# Patient Record
Sex: FEMALE | Race: BLACK OR AFRICAN AMERICAN
[De-identification: names, ages, dates, MRNs, and addresses within clinical notes are randomized per-mention and may not be internally consistent; named-entity substitution may affect disease eponyms.]

---

## 2020-10-15 ENCOUNTER — HOSPITAL ENCOUNTER (INPATIENT)
Dept: HOSPITAL 35 - ER | Age: 45
LOS: 5 days | Discharge: HOME | DRG: 378 | End: 2020-10-20
Attending: INTERNAL MEDICINE | Admitting: INTERNAL MEDICINE
Payer: COMMERCIAL

## 2020-10-15 VITALS — DIASTOLIC BLOOD PRESSURE: 112 MMHG | SYSTOLIC BLOOD PRESSURE: 213 MMHG

## 2020-10-15 VITALS — BODY MASS INDEX: 41.29 KG/M2 | WEIGHT: 233 LBS | HEIGHT: 62.99 IN

## 2020-10-15 VITALS — SYSTOLIC BLOOD PRESSURE: 218 MMHG | DIASTOLIC BLOOD PRESSURE: 115 MMHG

## 2020-10-15 DIAGNOSIS — Z88.0: ICD-10-CM

## 2020-10-15 DIAGNOSIS — E46: ICD-10-CM

## 2020-10-15 DIAGNOSIS — E11.51: ICD-10-CM

## 2020-10-15 DIAGNOSIS — I13.0: ICD-10-CM

## 2020-10-15 DIAGNOSIS — E11.319: ICD-10-CM

## 2020-10-15 DIAGNOSIS — N93.9: ICD-10-CM

## 2020-10-15 DIAGNOSIS — E66.01: ICD-10-CM

## 2020-10-15 DIAGNOSIS — Z89.511: ICD-10-CM

## 2020-10-15 DIAGNOSIS — N17.9: ICD-10-CM

## 2020-10-15 DIAGNOSIS — K20.91: ICD-10-CM

## 2020-10-15 DIAGNOSIS — K44.9: ICD-10-CM

## 2020-10-15 DIAGNOSIS — Z79.899: ICD-10-CM

## 2020-10-15 DIAGNOSIS — I25.10: ICD-10-CM

## 2020-10-15 DIAGNOSIS — Z88.6: ICD-10-CM

## 2020-10-15 DIAGNOSIS — K59.00: ICD-10-CM

## 2020-10-15 DIAGNOSIS — F12.90: ICD-10-CM

## 2020-10-15 DIAGNOSIS — N18.30: ICD-10-CM

## 2020-10-15 DIAGNOSIS — M19.90: ICD-10-CM

## 2020-10-15 DIAGNOSIS — K29.71: Primary | ICD-10-CM

## 2020-10-15 DIAGNOSIS — Z20.828: ICD-10-CM

## 2020-10-15 DIAGNOSIS — Z86.73: ICD-10-CM

## 2020-10-15 DIAGNOSIS — I50.9: ICD-10-CM

## 2020-10-15 DIAGNOSIS — N95.0: ICD-10-CM

## 2020-10-15 DIAGNOSIS — D64.9: ICD-10-CM

## 2020-10-15 DIAGNOSIS — I16.0: ICD-10-CM

## 2020-10-15 DIAGNOSIS — Z91.14: ICD-10-CM

## 2020-10-15 LAB
ALBUMIN SERPL-MCNC: 2.2 G/DL (ref 3.4–5)
ALT SERPL-CCNC: 26 U/L (ref 30–65)
ANION GAP SERPL CALC-SCNC: 12 MMOL/L (ref 7–16)
ANION GAP SERPL CALC-SCNC: 8 MMOL/L (ref 7–16)
APTT BLD: 30.3 SECONDS (ref 24.5–32.8)
AST SERPL-CCNC: 23 U/L (ref 15–37)
BASOPHILS NFR BLD AUTO: 0.8 % (ref 0–2)
BILIRUB DIRECT SERPL-MCNC: < 0.1 MG/DL
BILIRUB SERPL-MCNC: 0.3 MG/DL (ref 0.2–1)
BUN SERPL-MCNC: 25 MG/DL (ref 7–18)
BUN SERPL-MCNC: 27 MG/DL (ref 7–18)
CALCIUM SERPL-MCNC: 8.3 MG/DL (ref 8.5–10.1)
CALCIUM SERPL-MCNC: 8.6 MG/DL (ref 8.5–10.1)
CHLORIDE SERPL-SCNC: 111 MMOL/L (ref 98–107)
CHLORIDE SERPL-SCNC: 111 MMOL/L (ref 98–107)
CO2 SERPL-SCNC: 23 MMOL/L (ref 21–32)
CO2 SERPL-SCNC: 25 MMOL/L (ref 21–32)
CREAT SERPL-MCNC: 1.4 MG/DL (ref 0.6–1)
CREAT SERPL-MCNC: 1.5 MG/DL (ref 0.6–1)
EOSINOPHIL NFR BLD: 0.6 % (ref 0–3)
ERYTHROCYTE [DISTWIDTH] IN BLOOD BY AUTOMATED COUNT: 14.7 % (ref 10.5–14.5)
GLUCOSE SERPL-MCNC: 396 MG/DL (ref 74–106)
GLUCOSE SERPL-MCNC: 401 MG/DL (ref 74–106)
GRANULOCYTES NFR BLD MANUAL: 80 % (ref 36–66)
HCT VFR BLD CALC: 38.5 % (ref 37–47)
HCT VFR BLD CALC: 38.7 % (ref 37–47)
HGB BLD-MCNC: 12.7 GM/DL (ref 12–15)
HGB BLD-MCNC: 12.7 GM/DL (ref 12–15)
INR PPP: 1.1
LYMPHOCYTES NFR BLD AUTO: 12.8 % (ref 24–44)
MCH RBC QN AUTO: 29.1 PG (ref 26–34)
MCHC RBC AUTO-ENTMCNC: 33.1 G/DL (ref 28–37)
MCV RBC: 88 FL (ref 80–100)
MONOCYTES NFR BLD: 5.8 % (ref 1–8)
NEUTROPHILS # BLD: 7.4 THOU/UL (ref 1.4–8.2)
PLATELET # BLD: 257 THOU/UL (ref 150–400)
POTASSIUM SERPL-SCNC: 3.7 MMOL/L (ref 3.5–5.1)
POTASSIUM SERPL-SCNC: 3.7 MMOL/L (ref 3.5–5.1)
PROT SERPL-MCNC: 6.5 G/DL (ref 6.4–8.2)
PROTHROMBIN TIME: 11.4 SECONDS (ref 9.3–11.4)
RBC # BLD AUTO: 4.38 MIL/UL (ref 4.2–5)
SODIUM SERPL-SCNC: 144 MMOL/L (ref 136–145)
SODIUM SERPL-SCNC: 146 MMOL/L (ref 136–145)
WBC # BLD AUTO: 9.2 THOU/UL (ref 4–11)

## 2020-10-15 PROCEDURE — 62110: CPT

## 2020-10-15 PROCEDURE — 10081 I&D PILONIDAL CYST COMP: CPT

## 2020-10-15 PROCEDURE — 70005: CPT

## 2020-10-15 PROCEDURE — 62900: CPT

## 2020-10-15 NOTE — EMS
[address]   [address]                     EMS Patient Care Report       
_______________________________________________________________________________
 
Name:       VIKTOR TRUJILLO                    Room #:         213-P       ADM IN  
M.R.#:      [id number]                       Account #:      [id number]  
Admission:  10/15/20    Attend Phys:    Rivas Kelly MD   
Discharge:              Date of Birth:  [birth year]  
                                                          Report #: [id number]
                                                                    [id number]
_______________________________________________________________________________
THIS REPORT FOR:   //name//                          
 
Report Transmitted: 10/19/2020 09:00
EMS Care Summary
[place], [place]/[facility]
Incident [id number] @ 10/15/2020 08:18
 
Incident Location
[address] [place] [address]
 
Patient
VIKTOR TRUJILLO
Female, 45 Years
 1975
 
Patient Address
[address] [place] [address]
 
Patient History
Congestive Heart Failure (CHF),Diabetes,Hypertension (HTN),Stroke/CVA,Amputee,
 
Patient Allergies
Penicillin allergy,
 
Patient Medications
Iron, Carvedilol, Insulin, Furosemide, Protonix,
 
Chief Complaint
VOMITTING
 
Disposition
Transported No Lights/[place]
 
Dispatch Reason
Hemorrhage/Laceration
 
Transported To
[facility]
 
Narrative
DISPATCHED TO A HEMMORHAGE. ARRIVED ON SCENE TO FIND FIRE CREW TALKING WITH 
FEMALE PATIENT IN HER HOME. PATIENT WAS FOUND SITTING ON THE TOILET WITH A 
TRASH CAN IN HER HANDS. SHE SAID SHE WAS BLEEDING IN THE TOILET AND WAS UNAWARE 
 
 
 
[address]   [address]                     EMS Patient Care Report       
_______________________________________________________________________________
 
Name:       VIKTOR TRUJILLO                    Room #:         213-P       [facility] [place]  
M.R.#:      [id number]                       Account #:      [id number]  
Admission:  10/15/20    Attend Phys:    Rivas Kelly MD   
Discharge:              Date of Birth:  [birth year]  
                                                          Report #: [id number]
                                                                    [id number]
_______________________________________________________________________________
IF IT WAS COMING FROM HER VAGINA OR HER RECTUM. SHE SAID SHE HAS BEEN NAUSEAS 
AND VOMMITING FOR THE PAST 2 HOURS. PATIENT VITALS WERE OBTAINED ON FIRE CREWS 
MONITOR AND SHE WAS ASSISTED IN STANDING AND MOVING TO THE FRONT DOOR WHERE SHE 
WAS SEATED ON THE COT, COVERED, AND SECURED WITH STRAPS. PATIENT VITALS WERE 
REOBTAINED AND AN IV WAS LOOKED FOR BUT DUE TO POOR VASCULAR TONE WAS NOT 
ATTEMPTED. PATIENT WAS PUT ON A 3 LEAD AND SHE WAS TRANSPORTED TO THE HOSPITAL 
WITH VITALS MONITORED ENROUTE. UPON ARRIVAL AT THE HOSPITAL PATIENT WAS MOVED 
INTO ED ROOM 11 ON THE COT AND LIFTED OVER TO THE HOSPITAL BED. PATIENT CARE 
WAS TURNED OVER TO ED NURSING STAFF. 
 
Initial Vitals
@08:55P: 74,BP: 224/157,
@08:43P: 74,BP: 207/129,CO: 6,SpO2: 93,
@09:00P: 74,R: 18,BP: 125/84,Pain: 6/10,GCS: 15,SpO2: 98,Revised Trauma: 12,
@08:52P: 75,BP: 114/75,SpO2: 97,
@08:57P: 76,BP: 146/106,
@08:41P: 79,R: 78,BP: 230/131,Pain: 6/10,GCS: 15,Glucose: 466,CO: 5,SpO2: 
99,Revised Trauma: 11,MI Suspected: false 
 
Assessments
@08:25MENTAL:Person Oriented,Time Oriented,Event Oriented,Place 
Oriented,SKIN:Cold,Diaphoresis,HEENT:Head/Face: No Abnormalities,Neck/Airway: 
No Abnormalities,LUNG SOUNDS:General: Nausea,General: Vomiting,Left Upper: No 
Abnormalities,Right Upper: No Abnormalities,Left Lower: No Abnormalities,Right 
Lower: No Abnormalities,ABDOMEN:General: Nausea,General: Vomiting,Left Upper: 
No Abnormalities,Right Upper: No Abnormalities,Left Lower: No 
Abnormalities,Right Lower: No Abnormalities,PELVIS//GI:Rectal Bleeding,Pelvis 
GUOther,EXTREMITIES:Right Leg: Other,Left Leg: Other,Left Arm: No 
Abnormalities,Right Arm: No Abnormalities,PULSE:NEURO:No Abnormalities, 
 
Impression
Nausea
 
Procedures
@08:25ALS AssessmentResponse: UnchangedSucceeded@08:413-Lead ECGResponse: 
UnchangedSucceeded 
 
Timeline
08:16,Call Received
08:16,Dispatch Notified
08:18,Dispatched
08:18,En Route
08:23,On Scene
08:25,At Patient
08:25,ALS Assessment,Response: UnchangedSucceeded,
08:41,3-Lead ECG,Response: UnchangedSucceeded,
 
 
 
[address]   [address]                     EMS Patient Care Report       
_______________________________________________________________________________
 
Name:       VIKTOR TRUJILLO                    Room #:         213-P       ADM IN  
M.R.#:      [id number]                       Account #:      [id number]  
Admission:  10/15/20    Attend Phys:    Rivas Kelly MD   
Discharge:              Date of Birth:  [birth year]  
                                                          Report #: [id number]
                                                                    [id number]
_______________________________________________________________________________
08:41,BP: 230/131 M,PULSE: 79,RR: 78 R,SPO2: 99 Ox,ETCO2:  ,B,PAIN: 
6,GCS: 15, 
08:43,BP: 207/129 M,PULSE: 74,RR:  R,SPO2: 93 Ox,ETCO2:  ,BG: ,PAIN: ,GCS: ,
08:48,Depart Scene
08:52,BP: 114/75 M,PULSE: 75,RR:  R,SPO2: 97 Ox,ETCO2:  ,BG: ,PAIN: ,GCS: ,
08:55,BP: 224/157 M,PULSE: 74,RR:  R,SPO2:  Ox,ETCO2:  ,BG: ,PAIN: ,GCS: ,
08:57,BP: 146/106 M,PULSE: 76,RR:  R,SPO2:  Ox,ETCO2:  ,BG: ,PAIN: ,GCS: ,
09:00,BP: 125/84 M,PULSE: 74,RR: 18 R,SPO2: 98 Ox,ETCO2:  ,BG: ,PAIN: 6,GCS: 15,
09:02,At Destination
09:15,Call Closed
 
Disclaimer
v1.1     Copyright 2020 [employer]
This EMS Care Summary contains data elements from the applicable legal record 
(which may be displayed differently). It is designed to provide pertinent 
information for the following purposes: continuity of care, clinical quality, 
and state data reporting. The complete legal record is available to ED staff 
and administrators of the receiving hospital in [employer]'s Patient Tracker. All data 
is provided "as is."

## 2020-10-15 NOTE — EMS
[address]   [address]                     EMS Patient Care Report       
_______________________________________________________________________________
 
Name:       VIKTOR TRUJILLO                    Room #:                     PRE ER  
M.R.#:      [id number]                       Account #:      [id number]  
Admission:              Attend Phys:                          
Discharge:              Date of Birth:  [birth year]  
                                                          Report #: [id number]
                                                                    [id number]
_______________________________________________________________________________
THIS REPORT FOR:   //name//                          
 
Report Transmitted: 10/15/2020 08:44
EMS Care Summary
[place], [place]/[facility]
Incident [id number] @ 10/15/2020 08:18
 
Incident Location
[address] [place] [address]
 
Patient
VIKTOR TRUJILLO
Female, 45 Years
 1975
 
Patient Address
[address] [place] [address]
 
Patient History
Congestive Heart Failure (CHF),Diabetes,Hypertension (HTN),Stroke/CVA,Amputee,
 
Patient Allergies
Penicillin allergy,
 
Patient Medications
Iron, Carvedilol, Insulin, Furosemide, Protonix,
 
Chief Complaint
VOMITTING
 
Disposition
Transported No Lights/[place]
 
Dispatch Reason
Hemorrhage/Laceration
 
Transported To
[facility]
 
Narrative
DISPATCHED TO A HEMMORHAGE. ARRIVED ON SCENE TO FIND FIRE CREW TALKING WITH 
FEMALE PATIENT IN HER HOME. PATIENT WAS FOUND SITTING ON THE TOILET WITH A 
TRASH CAN IN HER HANDS. SHE SAID SHE WAS BLEEDING IN THE TOILET AND WAS UNAWARE 
 
 
 
[address]   [address]                     EMS Patient Care Report       
_______________________________________________________________________________
 
Name:       VIKTOR TRUJILLO                    Room #:                     PRE MAGAN PRATHER#:      [id number]                       Account #:      [id number]  
Admission:              Attend Phys:                          
Discharge:              Date of Birth:  [birth year]  
                                                          Report #: [id number]
                                                                    [id number]
_______________________________________________________________________________
IF IT WAS COMING FROM HER VAGINA OR HER RECTUM. SHE SAID SHE HAS BEEN NAUSEAS 
AND VOMMITING FOR THE PAST 2 HOURS. PATIENT VITALS WERE OBTAINED ON FIRE CREWS 
MONITOR AND SHE WAS ASSISTED IN STANDING AND MOVING TO THE FRONT DOOR WHERE SHE 
WAS SEATED ON THE COT, COVERED, AND SECURED WITH STRAPS. PATIENT VITALS WERE 
REOBTAINED AND AN IV WAS LOOKED FOR BUT DUE TO POOR VASCULAR TONE WAS NOT 
ATTEMPTED. PATIENT WAS PUT ON A 3 LEAD AND SHE WAS TRANSPORTED TO THE HOSPITAL 
WITH VITALS MONITORED ENROUTE. UPON ARRIVAL AT THE HOSPITAL PATIENT WAS MOVED 
INTO ED ROOM 11 ON THE COT AND LIFTED OVER TO THE HOSPITAL BED. PATIENT CARE 
WAS TURNED OVER TO ED NURSING STAFF. 
 
Initial Vitals
@08:55P: 74,BP: 224/157,
@08:43P: 74,BP: 207/129,CO: 6,SpO2: 93,
@09:00P: 74,R: 18,BP: 125/84,Pain: 6/10,GCS: 15,SpO2: 98,Revised Trauma: 12,
@08:52P: 75,BP: 114/75,SpO2: 97,
@08:57P: 76,BP: 146/106,
@08:41P: 79,R: 78,BP: 230/131,Pain: 6/10,GCS: 15,Glucose: 466,CO: 5,SpO2: 
99,Revised Trauma: 11,MI Suspected: false 
 
Assessments
@08:25MENTAL:Person Oriented,Time Oriented,Event Oriented,Place 
Oriented,SKIN:Cold,Diaphoresis,HEENT:Head/Face: No Abnormalities,Neck/Airway: 
No Abnormalities,LUNG SOUNDS:General: Nausea,General: Vomiting,Left Upper: No 
Abnormalities,Right Upper: No Abnormalities,Left Lower: No Abnormalities,Right 
Lower: No Abnormalities,ABDOMEN:General: Nausea,General: Vomiting,Left Upper: 
No Abnormalities,Right Upper: No Abnormalities,Left Lower: No 
Abnormalities,Right Lower: No Abnormalities,PELVIS//GI:Rectal Bleeding,Pelvis 
GUOther,EXTREMITIES:Right Leg: Other,Left Leg: Other,Left Arm: No 
Abnormalities,Right Arm: No Abnormalities,PULSE:NEURO:No Abnormalities, 
 
Impression
Nausea
 
Procedures
@08:25ALS AssessmentResponse: UnchangedSucceeded@08:413-Lead ECGResponse: 
UnchangedSucceeded 
 
Timeline
08:16,Call Received
08:16,Dispatch Notified
08:18,Dispatched
08:18,En Route
08:23,On Scene
08:25,At Patient
08:25,ALS Assessment,Response: UnchangedSucceeded,
08:41,3-Lead ECG,Response: UnchangedSucceeded,
 
 
 
[address]   [address]                     EMS Patient Care Report       
_______________________________________________________________________________
 
Name:       CASSANDRAVIKTOR                    Room #:                     PRE ER  
M.R.#:      [id number]                       Account #:      [id number]  
Admission:              Attend Phys:                          
Discharge:              Date of Birth:  [birth year]  
                                                          Report #: [id number]
                                                                    [id number]
_______________________________________________________________________________
08:41,BP: 230/131 M,PULSE: 79,RR: 78 R,SPO2: 99 Ox,ETCO2:  ,B,PAIN: 
6,GCS: 15, 
08:43,BP: 207/129 M,PULSE: 74,RR:  R,SPO2: 93 Ox,ETCO2:  ,BG: ,PAIN: ,GCS: ,
08:48,Depart Scene
08:52,BP: 114/75 M,PULSE: 75,RR:  R,SPO2: 97 Ox,ETCO2:  ,BG: ,PAIN: ,GCS: ,
08:55,BP: 224/157 M,PULSE: 74,RR:  R,SPO2:  Ox,ETCO2:  ,BG: ,PAIN: ,GCS: ,
08:57,BP: 146/106 M,PULSE: 76,RR:  R,SPO2:  Ox,ETCO2:  ,BG: ,PAIN: ,GCS: ,
09:00,BP: 125/84 M,PULSE: 74,RR: 18 R,SPO2: 98 Ox,ETCO2:  ,BG: ,PAIN: 6,GCS: 15,
09:02,At Destination
09:15,Call Closed
 
Disclaimer
v1.1     Copyright 2020 [employer]
This EMS Care Summary contains data elements from the applicable legal record 
(which may be displayed differently). It is designed to provide pertinent 
information for the following purposes: continuity of care, clinical quality, 
and state data reporting. The complete legal record is available to ED staff 
and administrators of the receiving hospital in [employer]'s Patient Tracker. All data 
is provided "as is."

## 2020-10-15 NOTE — NUR
tw hospitalist sly np, ok to leave cardene drip off. pt ok to go [facility], house supervisor next to her, she will notify her.

## 2020-10-16 VITALS — SYSTOLIC BLOOD PRESSURE: 167 MMHG | DIASTOLIC BLOOD PRESSURE: 87 MMHG

## 2020-10-16 VITALS — DIASTOLIC BLOOD PRESSURE: 104 MMHG | SYSTOLIC BLOOD PRESSURE: 163 MMHG

## 2020-10-16 VITALS — DIASTOLIC BLOOD PRESSURE: 95 MMHG | SYSTOLIC BLOOD PRESSURE: 214 MMHG

## 2020-10-16 VITALS — DIASTOLIC BLOOD PRESSURE: 98 MMHG | SYSTOLIC BLOOD PRESSURE: 171 MMHG

## 2020-10-16 VITALS — SYSTOLIC BLOOD PRESSURE: 177 MMHG | DIASTOLIC BLOOD PRESSURE: 100 MMHG

## 2020-10-16 VITALS — DIASTOLIC BLOOD PRESSURE: 92 MMHG | SYSTOLIC BLOOD PRESSURE: 178 MMHG

## 2020-10-16 VITALS — SYSTOLIC BLOOD PRESSURE: 182 MMHG | DIASTOLIC BLOOD PRESSURE: 101 MMHG

## 2020-10-16 VITALS — DIASTOLIC BLOOD PRESSURE: 54 MMHG | SYSTOLIC BLOOD PRESSURE: 124 MMHG

## 2020-10-16 VITALS — DIASTOLIC BLOOD PRESSURE: 77 MMHG | SYSTOLIC BLOOD PRESSURE: 148 MMHG

## 2020-10-16 VITALS — SYSTOLIC BLOOD PRESSURE: 214 MMHG | DIASTOLIC BLOOD PRESSURE: 130 MMHG

## 2020-10-16 VITALS — SYSTOLIC BLOOD PRESSURE: 163 MMHG | DIASTOLIC BLOOD PRESSURE: 94 MMHG

## 2020-10-16 VITALS — DIASTOLIC BLOOD PRESSURE: 68 MMHG | SYSTOLIC BLOOD PRESSURE: 162 MMHG

## 2020-10-16 VITALS — DIASTOLIC BLOOD PRESSURE: 130 MMHG | SYSTOLIC BLOOD PRESSURE: 214 MMHG

## 2020-10-16 LAB
ANION GAP SERPL CALC-SCNC: 17 MMOL/L (ref 7–16)
ANISOCYTOSIS BLD QL SMEAR: SLIGHT
BASOPHILS NFR BLD AUTO: 0 % (ref 0–2)
BUN SERPL-MCNC: 30 MG/DL (ref 7–18)
CALCIUM SERPL-MCNC: 8.2 MG/DL (ref 8.5–10.1)
CHLORIDE SERPL-SCNC: 114 MMOL/L (ref 98–107)
CO2 SERPL-SCNC: 17 MMOL/L (ref 21–32)
CREAT SERPL-MCNC: 2.2 MG/DL (ref 0.6–1)
EOSINOPHIL NFR BLD: 0 % (ref 0–3)
ERYTHROCYTE [DISTWIDTH] IN BLOOD BY AUTOMATED COUNT: 14.8 % (ref 10.5–14.5)
GLUCOSE SERPL-MCNC: 255 MG/DL (ref 74–106)
GRANULOCYTES NFR BLD MANUAL: 89 % (ref 36–66)
HCT VFR BLD CALC: 38.9 % (ref 37–47)
HCT VFR BLD CALC: 39.5 % (ref 37–47)
HGB BLD-MCNC: 12.6 GM/DL (ref 12–15)
HGB BLD-MCNC: 12.9 GM/DL (ref 12–15)
LYMPHOCYTES NFR BLD AUTO: 6 % (ref 24–44)
MAGNESIUM SERPL-MCNC: 1.8 MG/DL (ref 1.8–2.4)
MCH RBC QN AUTO: 29 PG (ref 26–34)
MCHC RBC AUTO-ENTMCNC: 32.4 G/DL (ref 28–37)
MCV RBC: 89.7 FL (ref 80–100)
MONOCYTES NFR BLD: 5 % (ref 1–8)
NEUTROPHILS # BLD: 9.3 THOU/UL (ref 1.4–8.2)
NEUTS BAND NFR BLD: 0 % (ref 0–8)
PLATELET # BLD: 206 THOU/UL (ref 150–400)
POTASSIUM SERPL-SCNC: 3.7 MMOL/L (ref 3.5–5.1)
RBC # BLD AUTO: 4.33 MIL/UL (ref 4.2–5)
SODIUM SERPL-SCNC: 148 MMOL/L (ref 136–145)
WBC # BLD AUTO: 10.5 THOU/UL (ref 4–11)

## 2020-10-16 PROCEDURE — 0DB98ZX EXCISION OF DUODENUM, VIA NATURAL OR ARTIFICIAL OPENING ENDOSCOPIC, DIAGNOSTIC: ICD-10-PCS | Performed by: INTERNAL MEDICINE

## 2020-10-16 PROCEDURE — 0DJD8ZZ INSPECTION OF LOWER INTESTINAL TRACT, VIA NATURAL OR ARTIFICIAL OPENING ENDOSCOPIC: ICD-10-PCS | Performed by: INTERNAL MEDICINE

## 2020-10-16 PROCEDURE — 0DB68ZX EXCISION OF STOMACH, VIA NATURAL OR ARTIFICIAL OPENING ENDOSCOPIC, DIAGNOSTIC: ICD-10-PCS | Performed by: INTERNAL MEDICINE

## 2020-10-16 NOTE — NUR
pt states that blood in bsc is from her period and not from stool. passed along
to sly soto and she ordered a fecal occult.

## 2020-10-16 NOTE — NUR
TW DR JEROME CORTES GI: PER DR CANO TW ENEMA IS ONLY PREP NEEDED AND
USUALLY DONE 1 HOUR PRIOR TO PROCEDURE.

## 2020-10-16 NOTE — NUR
ASSESS PT WITH DR. CROSS AT BEDSIDE FOR INCREASED NAUSEA. INSTRUCTED TO PLACE
ICE PACK ON FORHEAD AND DR. CROSS WILL ADJUST MEDICATIONS.

## 2020-10-16 NOTE — 2DMMODE
[facility]
[place]   [address]                   2 D/M-MODE ECHOCARDIOGRAM     
_______________________________________________________________________________
 
Name:       VIKTOR TRUJILLO                    Room #:         170-11      ADM IN  
M.R.#:      [id number]                       Account #:      [id number]  
Admission:  10/15/20    Attend Phys:    Rivas Kelly MD   
Discharge:              Date of Birth:  [birth year]  
                                                          Report #: [id number]
                                                                    [phone]
_______________________________________________________________________________
THIS REPORT FOR:  
 
cc:  FAM - No family physician/PCP 
     FAM - No family physician/PCP 
     Santi Ramirez MD [facility]                                        
                                                                       ~
 
--------------- APPROVED REPORT --------------
 
 
Study performed:  10/16/2020 09:27:59
 
EXAM: Comprehensive 2D, Doppler, and color-flow 
Echocardiogram 
Patient Location: ER    
Room #:  1     Status:  routine
 
      BSA:         2.05
HR: 70 bpm BP:          191/105 mmHg 
Rhythm: NSR     
 
Other Information 
Study Quality: Good
 
Indications
Diabetes
Hypertension/HDD
 
2D Dimensions
RVDd:  38.38 mm  
IVSd:  13.89 (7-11mm) LVOT Diam:  18.09 (18-24mm) 
LVDd:  51.47 mm  
PWd:  14.39 (7-11mm) Ascending Ao:  29.91 (22-36mm)
LVDs:  34.11 (25-40mm) 
Aortic Root:  29.15 mm IVC:  16.00 mm
 
Volumes
Left Atrial Volume (Systole) 
Single Plane 4CH:  34.46 mL Single Plane 2CH:  35.09 mL
    LA ESV Index:  19.00 mL/m2
 
Aortic Valve
AoV Peak Jonh.:  1.40 m/s 
AO Peak Gr.:  7.89 mmHg  LVOT Max P.38 mmHg
    LVOT Max V:  1.16 m/s
NANNETTE Vmax: 2.12 cm2  
 
 
[facility]
[address]
[place]  [address]
Phone:  [phone] 2 D/M-MODE ECHOCARDIOGRAM     
_______________________________________________________________________________
 
Name:            VIKTOR TRUJILLO                    Room #:        170-11      [facility] [place]
[facility].#:           [id number]          Account #:     [id number]  
Admission:       10/15/20         Attend Phys:   FIONA Del Toro
Discharge:                  Date of Birth: [birth year]  
                         Report #:      [id number]
        [id number]
_______________________________________________________________________________
 
Mitral Valve
    E/A Ratio:  0.6
    MV Decel. Time:  267.96 ms
MV E Max Jonh.:  0.64 m/s 
MV A Jonh.:  0.99 m/s  
MV PHT:  77.71 ms  
IVRT:  198.39 ms  
 
Pulmonary Valve
PV Peak Jonh.:  0.84 m/s PV Peak Gr.:  2.86 mmHg
 
Pulmonary Vein
P Vein S:    0.43 m/s P Vein A:  0.22 m/s
P Vein D:   0.29 m/s P Vein A Dur.:  101.5 msec
P Vein S/D Ratio:  1.48 
 
Left Ventricle
The left ventricle is normal size. There is normal LV segmental wall 
motion. Mild to moderate concentric left ventricular hypertrophy. The 
left ventricular systolic function is normal. The left ventricular 
ejection fraction is within the normal range. LVEF is 60-65%. Mild 
diastolic dysfunction
 
Right Ventricle
The right ventricle is normal size. The right ventricular systolic 
function is normal.
 
Atria
The left atrium size is normal. The right atrium size is 
normal.
 
Aortic Valve
The aortic valve is normal in structure. No aortic regurgitation is 
present. There is no aortic valvular stenosis.
 
Mitral Valve
The mitral valve is normal in structure. Mild mitral regurgitation. 
No evidence of mitral valve stenosis.
 
Tricuspid Valve
The tricuspid valve is normal in structure. There is no tricuspid 
valve regurgitation noted.
 
Pulmonic Valve
The pulmonary valve is normal in structure. There is no pulmonic 
 
 
[facility]
[employer] [place]  [address]
Phone:  [phone]                    2 D/M-MODE ECHOCARDIOGRAM     
_______________________________________________________________________________
 
Name:            VIKTOR TRUJILLO                    Room #:        170-11      ADM IN
.R.#:           [id number]          Account #:     [id number]  
Admission:       10/15/20         Attend Phys:   FIONA Del Toro
Discharge:                  Date of Birth: [birth year]  
                         Report #:      [id number]
        [id number]
_______________________________________________________________________________
valvular regurgitation.
 
Great Vessels
The aortic root is normal in size. IVC is normal in size and 
collapses >50% with inspiration.
 
Pericardium
There is no pericardial effusion.
 
<Conclusion>
The left ventricular systolic function is normal.
There is normal LV segmental wall motion.
Mild to moderate concentric left ventricular hypertrophy.
LVEF is 60-65%.
Mild diastolic dysfunction
The aortic valve is normal in structure.  No aortic regurgitation or 
stenosis
The mitral valve is normal in structure. Mild mitral 
regurgitation.
Pulmonary artery pressure could not be reliably ascertained
There is no pericardial effusion.
 
 
 
 
 
 
 
 
 
 
 
 
 
 
 
 
 
 
 
 
 
 
 
  <ELECTRONICALLY SIGNED>
   By: Santi Ramirez MD, FACC   
  10/16/20     1010
D: 10/16/20 1010                           _____________________________________
T: 10/16/20 1010                           Santi Ramirez MD, FACC     /INF

## 2020-10-16 NOTE — NUR
PT ORIENTED TO ROOM AND UNIT. BED LOW AND LOCKED SIDE RAILS UPX3, CALL LIGHT
IN REACH AND TELE APPLIED. WILL CONTINUE TO ASSESS.

## 2020-10-16 NOTE — NUR
VASCULAR ACCESS CONSULTED FOR PICC IN GI LAB. BOTH UPPER ARMS FLUID FILLED
FROM INFITRATED IV'S. BOTH ARMS ASSESSED UNABLE TO SEE BASILIC, BRACHIAL OVER
3CM DEEP, ATTEMPTED BUT UNABLE TO CANNULATE. ATTEMPTED ML IN R AND L CEPHALIC
BUT AGAIN UNABLE TO CANNULATE. SO RAC LONG #22G 2 1/2 INCH CATH PIV STARTED
FOR SCOPE. PT TOLERATED WELL. PT STATES HAD HX DRUG ABUSE WITH SCARRING ON
FOREARMS

## 2020-10-16 NOTE — NUR
PT EXTREMELY NAUSEATED AND BEGAN WRENCHING AS SOON AS SHE WAS ADMITTED TO
UNIT. GAVE IV ZOFRAN AND ASSESSED PT WITH CARDIAC NP AND SHE INSTRUCTED TO
ONLY GIVE IV MEDICATION FOR BLOOD PRESSURE. PT HAS HAD ELEVATED BLOOS PRESSURE
SINCE SHE WAS ADMITTED TO ED. WILL GIVE IV BLOOD PRESSURE MEDICATION AND IF
NOT ABLE TO KEEP SBP UNDER 170 WILL START CARDENE GTT PER CARDIOLOFY. SBP
ABOVE 200 AT THIS TIME.

## 2020-10-17 VITALS — DIASTOLIC BLOOD PRESSURE: 58 MMHG | SYSTOLIC BLOOD PRESSURE: 111 MMHG

## 2020-10-17 VITALS — SYSTOLIC BLOOD PRESSURE: 154 MMHG | DIASTOLIC BLOOD PRESSURE: 89 MMHG

## 2020-10-17 VITALS — SYSTOLIC BLOOD PRESSURE: 147 MMHG | DIASTOLIC BLOOD PRESSURE: 63 MMHG

## 2020-10-17 VITALS — SYSTOLIC BLOOD PRESSURE: 156 MMHG | DIASTOLIC BLOOD PRESSURE: 88 MMHG

## 2020-10-17 VITALS — DIASTOLIC BLOOD PRESSURE: 74 MMHG | SYSTOLIC BLOOD PRESSURE: 121 MMHG

## 2020-10-17 VITALS — SYSTOLIC BLOOD PRESSURE: 146 MMHG | DIASTOLIC BLOOD PRESSURE: 74 MMHG

## 2020-10-17 VITALS — DIASTOLIC BLOOD PRESSURE: 119 MMHG | SYSTOLIC BLOOD PRESSURE: 193 MMHG

## 2020-10-17 VITALS — SYSTOLIC BLOOD PRESSURE: 121 MMHG | DIASTOLIC BLOOD PRESSURE: 80 MMHG

## 2020-10-17 VITALS — SYSTOLIC BLOOD PRESSURE: 126 MMHG | DIASTOLIC BLOOD PRESSURE: 66 MMHG

## 2020-10-17 LAB
ALBUMIN SERPL-MCNC: 1.9 G/DL (ref 3.4–5)
ALT SERPL-CCNC: 29 U/L (ref 30–65)
ANION GAP SERPL CALC-SCNC: 14 MMOL/L (ref 7–16)
AST SERPL-CCNC: 28 U/L (ref 15–37)
BASOPHILS NFR BLD AUTO: 0.6 % (ref 0–2)
BILIRUB SERPL-MCNC: 0.3 MG/DL (ref 0.2–1)
BUN SERPL-MCNC: 30 MG/DL (ref 7–18)
CALCIUM SERPL-MCNC: 8 MG/DL (ref 8.5–10.1)
CHLORIDE SERPL-SCNC: 113 MMOL/L (ref 98–107)
CHOLEST SERPL-MCNC: 186 MG/DL (ref ?–200)
CO2 SERPL-SCNC: 20 MMOL/L (ref 21–32)
CREAT SERPL-MCNC: 2.1 MG/DL (ref 0.6–1)
EOSINOPHIL NFR BLD: 0.2 % (ref 0–3)
ERYTHROCYTE [DISTWIDTH] IN BLOOD BY AUTOMATED COUNT: 15 % (ref 10.5–14.5)
GLUCOSE SERPL-MCNC: 220 MG/DL (ref 74–106)
GRANULOCYTES NFR BLD MANUAL: 82.5 % (ref 36–66)
HCT VFR BLD CALC: 35.6 % (ref 37–47)
HCT VFR BLD CALC: 35.6 % (ref 37–47)
HDLC SERPL-MCNC: 40 MG/DL (ref 40–?)
HGB BLD-MCNC: 11.6 GM/DL (ref 12–15)
HGB BLD-MCNC: 11.9 GM/DL (ref 12–15)
LDLC SERPL-MCNC: 104 MG/DL (ref ?–100)
LYMPHOCYTES NFR BLD AUTO: 11.5 % (ref 24–44)
MAGNESIUM SERPL-MCNC: 1.9 MG/DL (ref 1.8–2.4)
MCH RBC QN AUTO: 29.2 PG (ref 26–34)
MCHC RBC AUTO-ENTMCNC: 32.6 G/DL (ref 28–37)
MCV RBC: 89.4 FL (ref 80–100)
MONOCYTES NFR BLD: 5.2 % (ref 1–8)
NEUTROPHILS # BLD: 7.7 THOU/UL (ref 1.4–8.2)
PHOSPHATE SERPL-MCNC: 4.3 MG/DL (ref 2.5–4.9)
PLATELET # BLD: 206 THOU/UL (ref 150–400)
POTASSIUM SERPL-SCNC: 3.7 MMOL/L (ref 3.5–5.1)
PROT SERPL-MCNC: 5.2 G/DL (ref 6.4–8.2)
RBC # BLD AUTO: 3.98 MIL/UL (ref 4.2–5)
SODIUM SERPL-SCNC: 147 MMOL/L (ref 136–145)
TC:HDL: 4.7 RATIO
TRIGL SERPL-MCNC: 213 MG/DL (ref ?–150)
VLDLC SERPL CALC-MCNC: 43 MG/DL (ref ?–40)
WBC # BLD AUTO: 9.3 THOU/UL (ref 4–11)

## 2020-10-17 NOTE — NUR
ASSUMED CARE OF PT AT SHIFT CHANGE. ASSESSMENT AS CHARTED MEDS GIVEN PER MAR.
PT A&OX4, NO C/O PAIN OR N/V. BP SLOWLY DECREASING, CARDENE DRIP CONTINUES. PT
TRYING CLEAR LIQUIDS. DIARRHEA BEGAN IN THE AFTERNOON. STILL NEED URINE AND
STOOL SAMPLE. WILL CONTINUE TO MONITOR AND FOLLOW POC.

## 2020-10-17 NOTE — EKG
[facility]
[place]   [address]                     ELECTROCARDIOGRAM REPORT      
_______________________________________________________________________________
 
Name:       VIKTOR TRUJILLO                    Room #:         213-P       ADM IN  
M.R.#:      [id number]                       Account #:      [id number]  
Admission:  10/15/20    Attend Phys:    Rivas Kelly MD   
Discharge:              Date of Birth:  [birth year]  
                                                          Report #: [id number]
                                                                    [id number]
_______________________________________________________________________________
THIS REPORT FOR:  
 
cc:  DOM Fu family physician/PCP 
     DOM Fu family physician/PCP 
     Santi Ramirez MD [facility]
THIS REPORT FOR:   //name//                          
 
                          [facility]
                                       
Test Date:    2020-10-16               Test Time:    22:47:45
Pat Name:     VIKTOR TRUJILLO               Department:   
Patient ID:   [id number]            Room:         213 
Gender:       F                        Technician:   MARVIN
:          1975               Requested By: Rivas Kelly
Order Number: [id number] MD:   Santi Ramirez
                                 Measurements
Intervals                              Axis          
Rate:         99                       P:            83
MO:           123                      QRS:          -32
QRSD:         83                       T:            61
QT:           372                                    
QTc:          478                                    
                           Interpretive Statements
Sinus rhythm
Leftward axis
Abnormal R-wave progression, late transition
Left ventricular hypertrophy
Baseline wander in lead(s) V1
Compared to ECG 10/15/2020 11:28:58
No significant changes
Electronically Signed On 10- 10:00:13 CDT by Santi Ramirez
[email or web address]
 
 
 
 
 
 
 
 
 
 
 
 
  <ELECTRONICALLY SIGNED>
   By: Santi Ramirez MD, FACC   
  10/17/20     1000
D: 10/16/20 2247                           _____________________________________
T: 10/16/20 2247                           Santi Ramirez MD, FACC     /EPI

## 2020-10-17 NOTE — NUR
pt lethargic at start of shift with nausea and vomiting, cardene gtt infusing,
prn meds given for c/o pain &n/v, approximatly 3 hours later pt was screeming
she needed help, no one was helping her and "she had pain everywhere none of
these meds are working, I can't breath" O2 sat 100% on ra placed on 2l/nc,
said it was too uncomfortable and pulled it off, bp remains elevated, called
Violeta, received orders for one time dose of Alprozolam and 50mcg of
fentanyl, explained to patient about medication and she calmed down and was
asleep the rest of the evening, had one incident of incont of bowel and
bladder, cardene gtt titrated to keep bp <160 and vomiting stopped while she
was sleeping. report given to next shift to con't with umberto Childers

## 2020-10-18 VITALS — SYSTOLIC BLOOD PRESSURE: 163 MMHG | DIASTOLIC BLOOD PRESSURE: 99 MMHG

## 2020-10-18 VITALS — DIASTOLIC BLOOD PRESSURE: 76 MMHG | SYSTOLIC BLOOD PRESSURE: 101 MMHG

## 2020-10-18 VITALS — DIASTOLIC BLOOD PRESSURE: 75 MMHG | SYSTOLIC BLOOD PRESSURE: 151 MMHG

## 2020-10-18 VITALS — DIASTOLIC BLOOD PRESSURE: 85 MMHG | SYSTOLIC BLOOD PRESSURE: 147 MMHG

## 2020-10-18 VITALS — DIASTOLIC BLOOD PRESSURE: 76 MMHG | SYSTOLIC BLOOD PRESSURE: 143 MMHG

## 2020-10-18 VITALS — DIASTOLIC BLOOD PRESSURE: 106 MMHG | SYSTOLIC BLOOD PRESSURE: 175 MMHG

## 2020-10-18 VITALS — SYSTOLIC BLOOD PRESSURE: 101 MMHG | DIASTOLIC BLOOD PRESSURE: 76 MMHG

## 2020-10-18 VITALS — SYSTOLIC BLOOD PRESSURE: 110 MMHG | DIASTOLIC BLOOD PRESSURE: 74 MMHG

## 2020-10-18 LAB
ALBUMIN SERPL-MCNC: 2 G/DL (ref 3.4–5)
ANION GAP SERPL CALC-SCNC: 16 MMOL/L (ref 7–16)
BACTERIA-REFLEX: (no result) /HPF
BILIRUB UR-MCNC: NEGATIVE MG/DL
BUN SERPL-MCNC: 31 MG/DL (ref 7–18)
CALCIUM SERPL-MCNC: 7.9 MG/DL (ref 8.5–10.1)
CHLORIDE SERPL-SCNC: 111 MMOL/L (ref 98–107)
CO2 SERPL-SCNC: 16 MMOL/L (ref 21–32)
COCAINE UR-MCNC: POSITIVE NG/ML
COLOR UR: YELLOW
CREAT SERPL-MCNC: 2.2 MG/DL (ref 0.6–1)
GLUCOSE SERPL-MCNC: 190 MG/DL (ref 74–106)
HCT VFR BLD CALC: 38.6 % (ref 37–47)
HGB BLD-MCNC: 12.6 GM/DL (ref 12–15)
KETONES UR STRIP-MCNC: NEGATIVE MG/DL
PHOSPHATE SERPL-MCNC: 4.1 MG/DL (ref 2.5–4.9)
POTASSIUM SERPL-SCNC: 3.6 MMOL/L (ref 3.5–5.1)
PROT/CREAT UR-RTO: 17.1
RBC # UR STRIP: (no result) /UL
SODIUM SERPL-SCNC: 143 MMOL/L (ref 136–145)
SP GR UR STRIP: 1.02 (ref 1–1.03)
URINE CLARITY: CLEAR
URINE CREATININE-RANDOM*: 47.8 MG/DL
URINE CREATININE-RANDOM*: 48.1 MG/DL
URINE GLUCOSE-RANDOM*: (no result)
URINE LEUKOCYTES-REFLEX: NEGATIVE
URINE NITRITE-REFLEX: NEGATIVE
URINE PROTEIN (DIPSTICK): (no result)
URINE PROTEIN-RANDOM*: 824.6 MG/DL (ref ?–11.9)
URINE SODIUM-RANDOM*: 27 MMOL/L
URINE WBC-REFLEX: (no result) /HPF (ref 0–5)
UROBILINOGEN UR STRIP-ACNC: 0.2 E.U./DL (ref 0.2–1)

## 2020-10-18 NOTE — NUR
ASSUMED CARE OF PATIENT AT 1900. PATIENT C/O ABDOMINAL PAIN AT INITIAL
ASSESSMENT. ADMINISTERED PRN FENTANYL AS ORDERED. PATIENT REPORTED RELIEF.
DISCONTINUED CARDENE DRIP AT APPROXIMATLEY 0115 DUE TO BP /76 (81).
PATIENT TAKING PO AMLODIPINE AND CARVEDILOL AS WELL. PATIENT APPEARS TO BE
PROGRESSING TOWARDS CARE PLAN GOALS.

## 2020-10-18 NOTE — HC
[facility]
[address]
[place]   [address]                     CONSULTATION                  
_______________________________________________________________________________
 
Name:       VIKTOR TRUJILLO                    Room #:         213-P       [facility] [place]  
M.R.#:      [id number]                       Account #:      [id number]  
Admission:  10/15/20    Attend Phys:    Rivas Kelly MD   
Discharge:              Date of Birth:  [birth year]  
                                                          Report #: [id number]
                                                                    [id number]   
_______________________________________________________________________________
THIS REPORT FOR:  
 
cc:  DOM Fu family physician/PCP 
     DOM Fu family physician/PCP                                        
     Krystle Hopkins MD                                         ~
 
 
DATE OF SERVICE:  10/17/2020
 
 
ENDOCRINE CONSULTATION NOTE
 
CONSULTING PHYSICIAN:  Dr. Kelly.
 
REASON FOR CONSULTATION:  Uncontrolled type 2 diabetes mellitus.
 
HISTORY OF PRESENT ILLNESS:  This is a 45-year-old female patient whose medical
background is noted for multiple significant medical issues including type 2
diabetes mellitus.  The patient was admitted a couple days ago for the issue of
rectal bleeding as well as hematemesis.
 
When discussing her type 2 diabetes history, the patient indicates that she has
had it for nearly 20 years following gestational diabetes.  Her course has been
complicated by the issues of diabetic retinopathy, diabetic nephropathy,
peripheral neuropathy, peripheral vascular disease.  She is status post right
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 
Also, she is known to have CAD and is status post MI and stent placement in
11/2019.
 
The patient has been recently maintained on a combination of Humalog insulin 35
units with meals in addition to Lantus insulin 30 units b.i.d.  Unfortunately,
she has not been checking her blood sugar levels at home, whatsoever, but
indicates that in the more distant past, these have tended to run in the 200-300
range without issues of hypoglycemia.
 
Additionally, she is known to have CHF, hypertension, and hyperlipidemia.
 
REVIEW OF SYSTEMS:
CONSTITUTIONAL:  Fatigue, tiredness, but no fever, chills or body weight
changes.
HEENT:  Negative for sore throat, sinus pain or ear drainage.
PULMONARY:  Occasional shortness of breath and cough, but no hemoptysis.
CARDIAC:  Dyspnea on exertion, lower extremity edema, no chest pain or syncope.
GASTROINTESTINAL:  Hematemesis, rectal bleeding, abdominal discomfort, nausea.
NEUROLOGY:  Peripheral diabetic neuropathy at baseline.  No issues with loss of
consciousness or seizure activity.  Otherwise, review of systems noncontributory
 
 
 
[facility]
[address]
[place]   [address]                     CONSULTATION                  
_______________________________________________________________________________
 
Name:       VIKTOR TRUJILLO                    Room #:         213-P       [facility] [place]  
.R.#:      [id number]                       Account #:      [id number]  
Admission:  10/15/20    Attend Phys:    Rivas Kelly MD   
Discharge:              Date of Birth:  [birth year]  
                                                          Report #: [id number]
                                                                    [id number]   
_______________________________________________________________________________
 
 
unless mentioned in HPI.
 
PAST MEDICAL HISTORY:
1.  Type 2 diabetes mellitus.
2.  Hypertension.
3.  Diabetic retinopathy.
4.  Diabetic nephropathy with CKD stage 3.
5.  Peripheral vascular disease, status post right BKA, status post left mid
metatarsal resection.
6.  Coronary artery disease, status post MI and stent placement in 2019.
7.  Hyperlipidemia.
8.  Morbid obesity.
9.  Osteoarthritis.
 
CURRENT MEDICATIONS:  Include pantoprazole 40 mg daily, alprazolam as needed,
Humalog supplemental scale, Cardene IV, metronidazole q. 8 hours, ondansetron as
needed.
 
ALLERGIES:  PENICILLIN AND MORPHINE.
 
FAMILY HISTORY:  Noncontributory.
 
SOCIAL HISTORY:  The patient is an active current every day smoker.  Denies use
of alcohol or illicit drugs.
 
PHYSICAL EXAMINATION:
GENERAL:  Pleasant -American female patient who is not in apparent pain
or distress.
VITAL SIGNS:  Blood pressure is 156/88 mmHg, heart rate is 80 beats per minute,
respirations 20 per minute, temperature 36.6 degrees Celsius.
CONSTITUTIONAL:  The patient is sitting upright in bed, appears comfortable, not
in apparent distress.
HEENT:  Anicteric sclerae.  Intact extraocular motions.
NECK:  Supple, without JVD, carotid bruits or lymphadenopathy, no thyromegaly.
CHEST:  Noted for moderate entry bilaterally with scattered rales.  No wheezes.
HEART:  Regular rate and rhythm without murmurs or gallops.
ABDOMEN:  Soft, lax.  No guarding.  Sluggish bowel sounds.
EXTREMITIES:  Lower extremity exam is noted for right BKA, left mid metatarsal
resection, left ankle edema.
NEUROLOGIC:  Awake, alert and oriented to time, place and person and largely
nonfocal.
PSYCHIATRIC:  Pleasant, interactive.  Normal mood and affect.
 
LABORATORY DATA:  The patient's blood glucose values have been reviewed at
 
 
 
[address]   [address]                     CONSULTATION                  
_______________________________________________________________________________
 
Name:       VIKTOR TRUJILLO                    Room #:         213-P       [facility] [place]  
M.R.#:      [id number]                       Account #:      [id number]  
Admission:  10/15/20    Attend Phys:    Rivas Kelly MD   
Discharge:              Date of Birth:  [birth year]  
                                                          Report #: [id number]
                                                                    [id number]   
_______________________________________________________________________________
 
 
length and these have run as high as 378 mg/dL and as low as 174 mg/dL, it was
at 187 prior to this dictation.  Otherwise, sodium 147, potassium 3.7, chloride
113, CO2 of 20, anion gap of 14, BUN 30, creatinine 2.1, baseline presentation
was 1.4, lipase 96.  Total bilirubin 0.3, calcium 8.0, phosphorus 4.3, magnesium
1.9, alkaline phosphatase 118, ALT 29, total protein 5.2, albumin 1.9.  Lactic
acid 1.2.  Total cholesterol 186, triglycerides 213, HDL 40, .  INR 1.1. 
White blood count 9.3, hemoglobin 11.6, hematocrit 35.6, platelets 206. 
Hemoglobin A1c is pending.
 
ASSESSMENT AND PLAN:
1.  Type 2 diabetes mellitus.  Uncontrolled at baseline as per the patient's
reported blood glucose values as well as the occurrence of significant severe
multiple end-organ damage due to diabetes as noted in HPI.  The patient was
counseled about the pathogenesis of type 2 diabetes mellitus as well as about
the importance of achieving and maintaining adequate glycemic control.  She has
been so far managed with a Humalog supplemental scale only and while her blood
glucose values have trended down somewhat, the control remains suboptimal. 
Also, the patient has practically been n.p.o. since her presentation and I
suspect that once her p.o. advances which she is now allowed to do that her
therapeutic needs will differ significantly.
 
That said, I would like to obtain a current hemoglobin A1c, reinitiate
long-acting insulin therapy with low dose Lantus at 12 units b.i.d., maintain
Humalog supplemental scale support, and plan on adding scheduled Humalog
coverage once the patient's p.o. intake is more reliable.  In the immediate
setting, continue with blood glucose monitoring a.c. and at bedtime.
2.  Hypertension.  The patient's level of blood pressure control has been an
issue, but is improving.  She is to continue the current regimen.
3.  Hyperlipidemia.  The patient's level of lipid control is suboptimal as her
LDL target should be more towards 55 mg/dL given her significant cardiovascular
disease issues.  She is to continue with atorvastatin therapy for the time
being.
 
I certainly appreciate this consultation by Dr. Kelly.
 
 
 
 
 
 
 
 
  <ELECTRONICALLY SIGNED>
   By: Krystle Hopkins MD         
  10/18/20     1124
D: 10/17/20 1216                           _____________________________________
T: 10/17/20 1309                           Krystle Hopkins MD           /nt

## 2020-10-18 NOTE — NUR
ASSUMED CARE OF PT AT 0700. PT IS A&OX4, REPORTS BEING AWAKE ALL NIGHT AND
WANTS TO SLEEP. PT REFUSED MEALS THIS SHIFT, ACCU CHECKS ACHS, INSULIN HELD.
URINE SAMPLE OBTAINED, INSUFFICIENT STOOL FOR SAMPLE THIS SHIFT. PT UPSET THAT
SHE WILL BE UNABLE TO SHOWER DUE TO SAFETY CONCERNS, BUT WAS GIVEN FULL
BEDBATH. VITAL SIGNS STABLE. FALL PRECAUTIONS IN PLACE AND NURSING WILL
CONTINUE TO MONITOR.

## 2020-10-19 VITALS — SYSTOLIC BLOOD PRESSURE: 173 MMHG | DIASTOLIC BLOOD PRESSURE: 98 MMHG

## 2020-10-19 VITALS — SYSTOLIC BLOOD PRESSURE: 196 MMHG | DIASTOLIC BLOOD PRESSURE: 93 MMHG

## 2020-10-19 VITALS — SYSTOLIC BLOOD PRESSURE: 165 MMHG | DIASTOLIC BLOOD PRESSURE: 98 MMHG

## 2020-10-19 VITALS — SYSTOLIC BLOOD PRESSURE: 179 MMHG | DIASTOLIC BLOOD PRESSURE: 113 MMHG

## 2020-10-19 VITALS — DIASTOLIC BLOOD PRESSURE: 99 MMHG | SYSTOLIC BLOOD PRESSURE: 178 MMHG

## 2020-10-19 LAB
ALBUMIN SERPL-MCNC: 2.1 G/DL (ref 3.4–5)
ANION GAP SERPL CALC-SCNC: 11 MMOL/L (ref 7–16)
BUN SERPL-MCNC: 26 MG/DL (ref 7–18)
CALCIUM SERPL-MCNC: 8 MG/DL (ref 8.5–10.1)
CHLORIDE SERPL-SCNC: 110 MMOL/L (ref 98–107)
CO2 SERPL-SCNC: 21 MMOL/L (ref 21–32)
CREAT SERPL-MCNC: 1.6 MG/DL (ref 0.6–1)
EST. AVERAGE GLUCOSE BLD GHB EST-MCNC: 197 MG/DL
GLUCOSE SERPL-MCNC: 126 MG/DL (ref 74–106)
GLYCOHEMOGLOBIN (HGB A1C): 8.5 % (ref 4.8–5.6)
PHOSPHATE SERPL-MCNC: 3.7 MG/DL (ref 2.5–4.9)
POTASSIUM SERPL-SCNC: 3.2 MMOL/L (ref 3.5–5.1)
SODIUM SERPL-SCNC: 142 MMOL/L (ref 136–145)

## 2020-10-19 NOTE — NUR
Nutrition: RD received consult stating "per protocol". Admitting Dx GIB, HTN.
PMH: DM, CHF, HTN, Right BKA, CVA. EGD showed moderate esophagitis, mild
gastritis, hiatal hernia. Full liquids past 2 days. Pt tolerating and feels
she would eat better with solid foods. Will advance to soft carb controlled
heart healthy. A1C 8.5. Pt denies further need for diet education although
admits she eats whatever she can get with food stamps. Able to verbalize basic
understanding of carbs and voices she takes her insulin. No recent weight
change. BMI 41, extreme class 3 obesity. Would benefit from lifestyle
modification/weight loss. Nsg voices pt wants to leave AMA. Consider low risk.

## 2020-10-19 NOTE — NUR
ASSUMED CARE OF PATIENT AT 1900. PATIENT HAS DENIED PAIN AT ASSESSMENTS. ONLY
ONE EPISODE OF LOOSE STOOL HAS OCCURRED AT THIS POINT IN THE SHIFT. NO S/S OF
BLEEDING. PATIENT STATED MULTIPLE TIMES THAT SHE IS READY TO GO HOME AND
REQUESTING TO DO SO ASAP. PATIENT APPEARS TO BE PROGRESSING TOWARDS HER CARE
PLAN GOALS.

## 2020-10-19 NOTE — HC
[facility]
[address]
[place]   [address]                     CONSULTATION                  
_______________________________________________________________________________
 
Name:       VIKTOR TRUJILLO                    Room #:         213-P       [facility] [place]  
M.R.#:      [id number]                       Account #:      [id number]  
Admission:  10/15/20    Attend Phys:    Rivas Kelly MD   
Discharge:              Date of Birth:  [birth year]  
                                                          Report #: [id number]
                                                                    [id number]   
_______________________________________________________________________________
THIS REPORT FOR:  
 
cc:  FAM - No family physician/PCP 
     FAM - No family physician/PCP                                        
     Jaspal Langley MD                                        ~
 
 
DATE OF SERVICE:  10/16/2020
 
 
NEPHROLOGY CONSULTATION
 
REASON FOR CONSULTATION:  Elevated creatinine level.
 
HISTORY OF PRESENT ILLNESS:  This is a 45-year-old female who got admitted
through the Emergency Room yesterday.  She came in with complaint of GI blood
losses, but after GI saw her and scoped her, it appears that this may have been
more vaginal bleeding.  She has a history of diabetes mellitus and has been very
hyperglycemic since admission.  She also has a history of hypertension and blood
pressure was very high, requiring a Cardene drip.  Her admitting creatinine
level was 1.5 yesterday.  It is up to 2.2 today.  She has bicarbonate of 17,
serum sodium of 148.  No urine studies are done.
 
The patient answers a few questions.  She normally gets her care at [facility].  She says she was scheduled to see a nephrologist, but she
never recalls seeing a nephrologist in the past.  She is unaware of diabetic
nephropathy.  She does not know what medicine she normally takes.  She says they
come in a packet as a Dosepak and she just takes some.  She does not have an
active list of her usual medications.
 
PAST MEDICAL HISTORY:  Longstanding diabetes and hypertension.  She has had a
previous CVA, but the deficit is unknown.  She has had a previous right
below-the-knee amputation and a left transmetatarsal amputation, presumably for
diabetic foot wounds.  There is documentation of some drug abuse issues.  Drug
screen is pending.  Unfortunately, I do not have much else to go on other than
that.
 
PHYSICAL EXAMINATION:
GENERAL:  Obtunded, lethargic, slurred speech in this is a 45-year-old female. 
She will awaken enough to answer a few questions and then drifts back off.
VITAL SIGNS:  Blood pressure 214/130, heart rate 81, temperature 98.5, oxygen
saturation 97%.
HEENT:  Shows pupils are equal and reactive.  Sclerae nonicteric.  Oral mucosa
is somewhat dry.
NECK:  Veins are not distended.
CHEST:  Clear bilaterally.
 
 
 
[facility]
[address] [place]   [address]                     CONSULTATION                  
_______________________________________________________________________________
 
Name:       VIKTOR TRUJILLO                    Room #:         213-P       [facility] [place]  
.R.#:      [id number]                       Account #:      [id number]  
Admission:  10/15/20    Attend Phys:    Rivas Kelly MD   
Discharge:              Date of Birth:  [birth year]  
                                                          Report #: [id number]
                                                                    [id number]   
_______________________________________________________________________________
 
 
CARDIOVASCULAR:  Heart has a regular rate and rhythm.
ABDOMEN:  Obese, has active bowel sounds, is nontender.  Her amputations are as
noted above.
EXTREMITIES:  She has no peripheral edema.  She is on a bit of IV fluids.
 
LABORATORY DATA:  From today, sodium 140, potassium 3.7, chloride 114,
bicarbonate 17, BUN 30, creatinine 2.2, glucose 255, AST 23, ALT 26, calcium
8.2, magnesium 1.8.  White count 10.5, hemoglobin 12.6, hematocrit 38.9, MCV of
89.7, platelets 206,000.  No urinalysis available.
 
ASSESSMENT:
1.  Hypertension, poor control.  She needs to be back on a Cardene drip to get
it controlled when she is more mentally with it and we can start to introduce
other medications.  Certainly with what is presumed to be diabetic kidney
disease, she needs an ACE inhibitor and/or angiotensin receptor blocker.  Her
severe hypertension would suggest she needs a multidrug regimen and will have to
work on that as time goes forward.
2.  Longstanding diabetes mellitus.  She needs insulin at this point to get the
sugar down.
3.  Acute kidney injury, likely on top of some chronic kidney disease, which I
suspect could be diabetic nephropathy.  No urines are done.  We will check a
urine as well as fractional excretion of sodium and urine protein evaluation.
4.  In the interim, she needs some IV fluids.  We will try to get her to the
point where she is responding, so we can have more conversations with her about
what medications she takes and get a feel for the level of care that she has
been getting and we will use that as a starting point to get her hopefully on a
better trend long-term.
 
 
 
 
 
 
 
 
 
 
 
 
 
 
 
  <ELECTRONICALLY SIGNED>
   By: Jaspal Langley MD        
  10/19/20     0741
D: 10/16/20 1721                           _____________________________________
T: 10/16/20 1856                           Jaspal Langley MD          /nt

## 2020-10-20 VITALS — DIASTOLIC BLOOD PRESSURE: 88 MMHG | SYSTOLIC BLOOD PRESSURE: 165 MMHG

## 2020-10-20 VITALS — DIASTOLIC BLOOD PRESSURE: 84 MMHG | SYSTOLIC BLOOD PRESSURE: 164 MMHG

## 2020-10-20 NOTE — NUR
ASSESSMENT AS CHARTED- MEDS AS PER MAR -  NO CO'S OF PAIN OR NAUSEA.  KIM DIET
AND FLUIDS.  REFUSED BREAKFAST AND AM LONG ACTING INSULIN THIS AM.  UP TO THE
BEDSIDE COMMODE.  CO'S OF PAIN WHERE IV WAS PLACED -  PT HOME  - INSTRUCTION
RE HOME MEDS/ CARE AND FOLLOW UP GIVEN TO PATIENT - STATED UNDERSTANDING OF
INSTRUCTION GIVEN.  PT LEFT THE UNIT VIA WHEEL CHAIR  HOME [employer] PVT VEHICLE
ACCOMPANIED BY SISTER -  IV AND MONITOR REMOVED PRIOR TO D/C.

## 2020-10-20 NOTE — NUR
pt resting quietly thru the noc, vss, prn pain med given for bilat arm pain
from iv sites, up with assist to bsc, hopes to go home today, will con't to
monitor per protocol.

## 2020-10-20 NOTE — PATH
[facility]
[address]
[place]   [address]                     PATHOLOGY RPT PROCEDURE       
_______________________________________________________________________________
 
Name:       AGATHA TRUJILLO                    Room #:         213-P       [facility] [place]  
M.R.#:      [id number]     Account #:      [id number]  
Admission:  10/15/20    Date of Birth:  [birth year]  
Discharge:                                              Report #:    [id number]
                                                        Path Case #: [id number]
_______________________________________________________________________________
 
[facility] Accession Number: [id number]
.                                                                01
Material submitted:                                        .
PART A: duodenum - BIOPSY DUODENUM R/O CELIAC
PART B: stomach - BIOPSY ANTRUM R/O H. PYLORI
.                                                                01
Clinical history:                                          .
HEMATEMESIS, ?BLOOD PER RECTUM; HIATAL HERNIA, ESOPHAGITIS, GASTRITIS,
UPPER GI BLEED, HYPERTENSION
.                                                                02
**********************************************************************
Diagnosis:
A. Small bowel "duodenum", endoscopic biopsy:
   - Duodenal mucosa without significant pathologic alteration.
.
B. Stomach "antrum", endoscopic biopsy:
   - Gastric antral and oxyntic mucosa with features of mild reactive
      gastropathy (chemical gastritis).
   - Negative for intestinal metaplasia, dysplasia, and malignancy.
   - Negative for Helicobacter pylori.
.
(MLK:shalonda; 10/19/2020)
[facility]  10/20/2020  0842 Local
**********************************************************************
.                                                                02
Electronically signed:                                     .
Rosalba Villarreal MD, Pathologist
NPI- [id number]
.                                                                01
Gross description:                                         .
A.  Received in formalin labeled "[place], Agatha, BX duodenum rule out
celiac" are multiple tan-brown soft tissue fragments measuring in
aggregate 0.5 x 0.4 x 0.1 cm. The specimen is submitted entirely in A1.
.
B.  Received in formalin labeled "[place], Agatha, BX antrum rule out H.
pylori" are two tan-brown soft tissue fragments measuring in aggregate 0.5
x 0.4 x 0.1 cm. The specimen is submitted entirely in B1. ([facility] [place];
10/17/2020)
[facility]  10/17/2020  1020 Local
.                                                                02
Microscopic:                                                    .
Immunohistochemical stain results (properly controlled):
.
 Helicobacter pylori (block B1) - negative for organisms.
(MLK:shalonda; 10/19/2020)
.                                                                02
Pathologist provided ICD-10:
 
 
[address]   [address]                     PATHOLOGY RPT PROCEDURE       
_______________________________________________________________________________
 
Name:       AGATHA TRUJILLO                    Room #:         213-P       [facility] [place]  
M.R.#:      [id number]     Account #:      [id number]  
Admission:  10/15/20    Date of Birth:  [birth year]  
Discharge:                                              Report #:    [id number]
                                                        Path Case #: [id number]
_______________________________________________________________________________
K62.5, K44.9, K20.80, K29.50, K92.2, I10
.                                                                02
CPT                                                        .
017513, 402857, V44572
Specimen Comment: A courtesy copy of this report has been sent to [phone],
[phone]
Specimen Comment: [id number]
Specimen Comment: Report sent to  / DR SIMMONS
***Performed at:  01
   [address], [place]  [id number]
   MD Maicol Jacob MD Phone:  [phone]
***Performed at:  02
   [address]  [id number]
   MD Anju Valente MD Phone:  [phone]